# Patient Record
Sex: FEMALE | Race: WHITE | NOT HISPANIC OR LATINO | Employment: PART TIME | URBAN - METROPOLITAN AREA
[De-identification: names, ages, dates, MRNs, and addresses within clinical notes are randomized per-mention and may not be internally consistent; named-entity substitution may affect disease eponyms.]

---

## 2020-08-18 ENCOUNTER — TRANSFERRED RECORDS (OUTPATIENT)
Dept: HEALTH INFORMATION MANAGEMENT | Facility: CLINIC | Age: 30
End: 2020-08-18

## 2020-08-18 LAB
HBV SURFACE AG SERPL QL IA: NORMAL
HIV 1+2 AB+HIV1 P24 AG SERPL QL IA: NORMAL
RUBELLA ANTIBODY IGG QUANTITATIVE: NORMAL IU/ML

## 2020-10-08 ENCOUNTER — TRANSFERRED RECORDS (OUTPATIENT)
Dept: HEALTH INFORMATION MANAGEMENT | Facility: CLINIC | Age: 30
End: 2020-10-08

## 2020-10-08 ENCOUNTER — MEDICAL CORRESPONDENCE (OUTPATIENT)
Dept: HEALTH INFORMATION MANAGEMENT | Facility: CLINIC | Age: 30
End: 2020-10-08

## 2020-10-09 ENCOUNTER — TRANSCRIBE ORDERS (OUTPATIENT)
Dept: MATERNAL FETAL MEDICINE | Facility: CLINIC | Age: 30
End: 2020-10-09

## 2020-10-09 DIAGNOSIS — O26.90 PREGNANCY RELATED CONDITION, ANTEPARTUM: Primary | ICD-10-CM

## 2020-10-12 ENCOUNTER — PRE VISIT (OUTPATIENT)
Dept: MATERNAL FETAL MEDICINE | Facility: CLINIC | Age: 30
End: 2020-10-12

## 2020-10-14 ENCOUNTER — HOSPITAL ENCOUNTER (OUTPATIENT)
Dept: CARDIOLOGY | Facility: CLINIC | Age: 30
Discharge: HOME | End: 2020-10-14
Admitting: PEDIATRICS
Payer: COMMERCIAL

## 2020-10-14 DIAGNOSIS — O26.90 PREGNANCY RELATED CONDITION, ANTEPARTUM: ICD-10-CM

## 2020-10-14 PROCEDURE — 76827 ECHO EXAM OF FETAL HEART: CPT

## 2020-10-14 PROCEDURE — 99203 OFFICE O/P NEW LOW 30 MIN: CPT | Mod: 25 | Performed by: PEDIATRICS

## 2020-10-14 PROCEDURE — 93325 DOPPLER ECHO COLOR FLOW MAPG: CPT | Mod: 26 | Performed by: PEDIATRICS

## 2020-10-14 PROCEDURE — 76825 ECHO EXAM OF FETAL HEART: CPT | Mod: 26 | Performed by: PEDIATRICS

## 2020-10-14 PROCEDURE — 76827 ECHO EXAM OF FETAL HEART: CPT | Mod: 26 | Performed by: PEDIATRICS

## 2020-10-14 NOTE — PROGRESS NOTES
[facility]   Heart Center Fetal Consult Note    Patient:  Janey John MRN:  [id number]   YOB: 1990 Age:  30 year old   Date of Visit:  10/14/2020 PCP:  [facility]     Dear Physician,     I had the pleasure of seeing Janey John at the [facility] on 10/14/2020 in fetal cardiology consultation for fetal echocardiogram results. She presented today accompanied by herself. 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    I performed and interpreted the fetal echocardiogram today, which demonstrated double outlet right ventricle with subaortic ventricular septal defect with aorta posterior and rightward to the pulmonary artery. 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 Antegrade flow across the pulmonary valve.The right and left branch pulmonary arteries are mildly hypoplastic with z-scores of -2.52 and -2.02; respectively. Normal antegrade flow across the ductus arteriosus. Normal right and left ventricular size and systolic function. Fetal heart rate regular at 144 bpm. No hydrops.       With the help of diagrams, I reviewed the echo findings today with Janey John. I discussed the fetal cardiac anatomy, function, physiology, and plans for possible intervention following delivery. The pulmonary valve is moderately hypoplastic with antegrade flow across the valve and ductus arteriosus. We will need to monitor the growth of the pulmonary valve throughout pregnancy and flow across the ductus arteriosus. I explained that a post-newton echocardiogram will be important with monitoring Ed's saturations to determine the need for a shunt. The right and left ventricles are normal in size and this will likely be a 2 ventricle repair. I recommended  delivery at [facility], with plans for post- echocardiogram. She had appropriate questions which I addressed. I provided her with my direct contact information for additional questions after they left.    Plan:    1. Follow up Fetal Echo in 4-5 weeks.  2. This anatomy maybe dependent on the ductus arteriosus, and PGE maybe needed following delivery.  3. Transthoracic echocardiogram to be obtained after delivery immediately on arrival in the NICU.    Thank you for allowing me to participate in Janey's care. Please do not hesitate to contact me with questions or concerns.    This visit was separate from the performance and interpretation of the ultrasound. The majority of the time (>50%) was spent in counseling and coordination of care. I spent approximately 30 minutes in face-to-face time reviewing the above considerations.    Heath Martínez M.D.  Pediatric Cardiology  [facility] [address], 5th floor, [facility] [address]  Phone [phone]  Fax [phone]

## 2020-11-05 ENCOUNTER — TRANSFERRED RECORDS (OUTPATIENT)
Dept: HEALTH INFORMATION MANAGEMENT | Facility: CLINIC | Age: 30
End: 2020-11-05

## 2020-11-19 ENCOUNTER — TELEPHONE (OUTPATIENT)
Dept: EMERGENCY MEDICINE | Facility: CLINIC | Age: 30
End: 2020-11-19

## 2020-11-19 NOTE — TELEPHONE ENCOUNTER
Patient called to cancel fetal echo appointment. Offered to reschedule and patient denied. She said she would call to reschedule. No reason provided for cancellation.

## 2020-12-14 ENCOUNTER — TRANSFERRED RECORDS (OUTPATIENT)
Dept: HEALTH INFORMATION MANAGEMENT | Facility: CLINIC | Age: 30
End: 2020-12-14

## 2020-12-24 ENCOUNTER — TRANSFERRED RECORDS (OUTPATIENT)
Dept: HEALTH INFORMATION MANAGEMENT | Facility: CLINIC | Age: 30
End: 2020-12-24

## 2020-12-28 ENCOUNTER — TRANSFERRED RECORDS (OUTPATIENT)
Dept: HEALTH INFORMATION MANAGEMENT | Facility: CLINIC | Age: 30
End: 2020-12-28

## 2020-12-29 ENCOUNTER — MEDICAL CORRESPONDENCE (OUTPATIENT)
Dept: HEALTH INFORMATION MANAGEMENT | Facility: CLINIC | Age: 30
End: 2020-12-29

## 2020-12-29 ENCOUNTER — TRANSFERRED RECORDS (OUTPATIENT)
Dept: HEALTH INFORMATION MANAGEMENT | Facility: CLINIC | Age: 30
End: 2020-12-29

## 2020-12-30 ENCOUNTER — TELEPHONE (OUTPATIENT)
Dept: MATERNAL FETAL MEDICINE | Facility: CLINIC | Age: 30
End: 2020-12-30

## 2020-12-30 DIAGNOSIS — O36.5990 PREGNANCY AFFECTED BY FETAL GROWTH RESTRICTION: ICD-10-CM

## 2020-12-30 DIAGNOSIS — O35.BXX0 ANOMALY OF HEART OF FETUS AFFECTING PREGNANCY, ANTEPARTUM, SINGLE OR UNSPECIFIED FETUS: Primary | ICD-10-CM

## 2020-12-30 NOTE — TELEPHONE ENCOUNTER
Left message for Janey to call MFM RN coordinator at [phone] regarding appts on 1/6. Pt should come at 0800 for BPP/NST/OBV, fetal echo at 0900.     Amy Bonner RN

## 2021-01-05 ENCOUNTER — DOCUMENTATION ONLY (OUTPATIENT)
Dept: MATERNAL FETAL MEDICINE | Facility: CLINIC | Age: 31
End: 2021-01-05

## 2021-01-05 DIAGNOSIS — O35.BXX0 ANOMALY OF HEART OF FETUS AFFECTING PREGNANCY, ANTEPARTUM, SINGLE OR UNSPECIFIED FETUS: Primary | ICD-10-CM

## 2021-01-05 NOTE — PROGRESS NOTES
This patient was reviewed at the Pediatric Genetics Meeting on 1/5/21. Due to the fetal CHD, the geneticists recommended ordering a microarray on cordblood at delivery as well as an inpatient genetics consult. Genetic counseling will meet with Janey prior to delivery to obtain consent for cordblood testing as well as place orders in the fetal chart.    Orders placed for genetic counseling appt.    Lyssa Felix MS, [facility]  Genetic Counselor  Maternal Fetal Medicine  [facility]   Phone: [phone]  Pager: [phone]  Email: [email or web address]

## 2021-01-06 ENCOUNTER — OFFICE VISIT (OUTPATIENT)
Dept: MATERNAL FETAL MEDICINE | Facility: CLINIC | Age: 31
End: 2021-01-06
Attending: OBSTETRICS & GYNECOLOGY
Payer: MEDICAID

## 2021-01-06 ENCOUNTER — HOSPITAL ENCOUNTER (OUTPATIENT)
Dept: ULTRASOUND IMAGING | Facility: CLINIC | Age: 31
End: 2021-01-06
Attending: OBSTETRICS & GYNECOLOGY
Payer: MEDICAID

## 2021-01-06 ENCOUNTER — HOSPITAL ENCOUNTER (OUTPATIENT)
Dept: CARDIOLOGY | Facility: CLINIC | Age: 31
End: 2021-01-06
Payer: MEDICAID

## 2021-01-06 VITALS
HEART RATE: 79 BPM | DIASTOLIC BLOOD PRESSURE: 68 MMHG | WEIGHT: 175 LBS | RESPIRATION RATE: 18 BRPM | OXYGEN SATURATION: 100 % | SYSTOLIC BLOOD PRESSURE: 109 MMHG

## 2021-01-06 DIAGNOSIS — O36.5990 PREGNANCY AFFECTED BY FETAL GROWTH RESTRICTION: ICD-10-CM

## 2021-01-06 DIAGNOSIS — O09.93 SUPERVISION OF HIGH RISK PREGNANCY IN THIRD TRIMESTER: Primary | ICD-10-CM

## 2021-01-06 DIAGNOSIS — O26.90 PREGNANCY RELATED CONDITION, ANTEPARTUM: ICD-10-CM

## 2021-01-06 DIAGNOSIS — O35.BXX0 ANOMALY OF HEART OF FETUS AFFECTING PREGNANCY, ANTEPARTUM, SINGLE OR UNSPECIFIED FETUS: Primary | ICD-10-CM

## 2021-01-06 DIAGNOSIS — O35.BXX0 ANOMALY OF HEART OF FETUS AFFECTING PREGNANCY, ANTEPARTUM, SINGLE OR UNSPECIFIED FETUS: ICD-10-CM

## 2021-01-06 PROCEDURE — 93325 DOPPLER ECHO COLOR FLOW MAPG: CPT | Mod: XU

## 2021-01-06 PROCEDURE — G0463 HOSPITAL OUTPT CLINIC VISIT: HCPCS | Mod: 25

## 2021-01-06 PROCEDURE — 76819 FETAL BIOPHYS PROFIL W/O NST: CPT

## 2021-01-06 PROCEDURE — 93325 DOPPLER ECHO COLOR FLOW MAPG: CPT | Mod: 26 | Performed by: PEDIATRICS

## 2021-01-06 PROCEDURE — 99213 OFFICE O/P EST LOW 20 MIN: CPT | Mod: 25 | Performed by: ADVANCED PRACTICE MIDWIFE

## 2021-01-06 PROCEDURE — 76825 ECHO EXAM OF FETAL HEART: CPT | Mod: 26 | Performed by: PEDIATRICS

## 2021-01-06 PROCEDURE — 76827 ECHO EXAM OF FETAL HEART: CPT | Mod: 26 | Performed by: PEDIATRICS

## 2021-01-06 PROCEDURE — 76820 UMBILICAL ARTERY ECHO: CPT | Mod: 26 | Performed by: OBSTETRICS & GYNECOLOGY

## 2021-01-06 PROCEDURE — 76818 FETAL BIOPHYS PROFILE W/NST: CPT | Mod: 26 | Performed by: OBSTETRICS & GYNECOLOGY

## 2021-01-06 PROCEDURE — 59025 FETAL NON-STRESS TEST: CPT

## 2021-01-06 RX ORDER — VITAMIN A ACETATE, .BETA.-CAROTENE, ASCORBIC ACID, CHOLECALCIFEROL, .ALPHA.-TOCOPHEROL ACETATE, DL-, THIAMINE MONONITRATE, RIBOFLAVIN, NIACINAMIDE, PYRIDOXINE HYDROCHLORIDE, FOLIC ACID, CYANOCOBALAMIN, CALCIUM CARBONATE, FERROUS FUMARATE, ZINC OXIDE, AND CUPRIC OXIDE 2000; 2000; 120; 400; 22; 1.84; 3; 20; 10; 1; 12; 200; 27; 25; 2 [IU]/1; [IU]/1; MG/1; [IU]/1; MG/1; MG/1; MG/1; MG/1; MG/1; MG/1; UG/1; MG/1; MG/1; MG/1; MG/1
1 TABLET ORAL
COMMUNITY

## 2021-01-06 ASSESSMENT — PAIN SCALES - GENERAL: PAINLEVEL: NO PAIN (0)

## 2021-01-06 NOTE — PROGRESS NOTES
[facility]   Heart Center Fetal Consult Note    Patient:  Janey John MRN:  [id number]   YOB: 1990 Age:  30 year old   Date of Visit:  2021 PCP:  [facility]     Dear Physician,     I had the pleasure of seeing Janey John at the [facility] on 2021 in fetal cardiology consultation for ongoing fetal management. She presented today accompanied by herself. 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    I performed and interpreted the fetal echocardiogram today, which demonstrated double outlet right ventricle with subaortic ventricular septal defect with aorta posterior and rightward to the pulmonary artery. Mildl hypoplasia of the pulmonary valve and main pulmonary artery. Antegrade flow across the pulmonary valve.The right and left branch pulmonary arteries are likely normal in size. The ductus arteriosus was not well seen on today's study. Normal right and left ventricular size and systolic function. Fetal heart rate regular at 149 bpm. No hydrops.     I reviewed the echo findings today with Janey John. I discussed the fetal cardiac anatomy, function, physiology, and plans for evaluation post-natally. I recommended delivery at [facility], with plans for a cardiology consult and an echocardiogram in a non-urgent fashion. I reviewed the importance of a post-newton transthoracic echocardiogram to confirm these findings and help direct management. She had appropriate questions which I addressed. I provided her with my direct contact information for additional questions after they left.    Plan:    1. No follow up fetal echocardiogram.   2. This anatomy is likely not dependent on the ductus arteriosus.   3. Transthoracic echocardiogram and cardiology consult to be obtained after  in non-urgent fashion within the first 12 hours of life.     Thank you for allowing me to participate in Janey's care. Please do not hesitate to contact me with questions or concerns.    This visit was separate from the performance and interpretation of the ultrasound. The majority of the time (>50%) was spent in counseling and coordination of care. I spent approximately 15 minutes in face-to-face time reviewing the above considerations.    Heath Martínez M.D.  Pediatric Cardiology  [facility] [address], 5th floor, [place] [address]  Phone [phone]  Fax [phone]

## 2021-01-06 NOTE — PROGRESS NOTES
Maternal-Fetal Medicine   First OB Visit    Janey John  : 1990  MRN: [id number]      HPI:  Janey John is a 30 year old  at 33w4d by LMP consistent with 13w0d US here for new OB visit     Today she is feeling well. Had US, NST, and fetal echo prior to OB visit and is understandably feeling a little overwhelmed/tired. Does not have any pregnancy concerns at this time. Prefers to await labor rather than be induced if possible, but understanding there might be other recommendations. She reports that based on today's echo, the pediatric team is anticipating surgical repair of baby's heart a few months after delivery.    Pregnancy complicated by:  - DORV, VSD  - Bilateral UTD - resolved  - Enlarged cisterna magna  - FGR (8%tile on )    Prenatal Care:  Primary OB care this pregnancy has been with Vanessa Booth CNM  from [facility].    Dating:    LMP: 20, cycles regular  Dating ultrasound: 20 at 13w0d  Assisted reproduction: none  Assigned EDC: 21 by LMP      OB HISTORY  OB History    Para Term  AB Living   3 2 2 0 0 2   SAB TAB Ectopic Multiple Live Births   0 0 0 0 0      # Outcome Date GA Lbr Sav/2nd Weight Sex Delivery Anes PTL Lv   3 Current            2 Term  40w1d       JAUN   1 Term  40w0d       JAUN         History of GDM: No,  PTL : No,  History of HTN in pregnancy: No,  Thrombocytopenia: No,  Shoulder dystocia: No,  Vacuum Extraction: No  PPH: No   3rd of 4th degree laceration: No.   Other complications: No    Gynecologic History:  - Last Pap: 20, NILM  - Denies any history of abnormal pap smears  - Denies prior cervical surgery or procedures  - Denies any history of frequent UTIs, vaginal infections, or STIs    Past Medical History:  none  Past Surgical History:  Removal of ganglion cyst on wrist    INFECTION HISTORY  HIV: No  Hepatitis B: No  Hepatitis C: No  Tuberculosis: No    Genital Herpes self: no  Herpes partner:   "no  Chlamydia:  no  Gonorrhea:  no  HPV: No  BV:  No  Syphilis:  No  Chicken Pox:  Yes - vaccine    Current Medications:  Prior to Admission medications    Medication Sig Last Dose Taking? Auth Provider   Prenatal Vit-Fe Fumarate-FA (PNV PRENATAL PLUS MULTIVITAMIN) 27-1 MG TABS per tablet Take 1 tablet by mouth Taking Yes Reported, Patient       Allergies:  NKDA    Social History:   Occupation: part-time   Status: , feels safe in relationship. Lives with  and other children  Denies use of alcohol, drugs or smoking.    Family History:  Denies history of genetic disorders, preeeclampsia, thromboembolic disease, bleeding disorders, mental retardation    Partner History:  Non-contributory    ROS:  10-point ROS negative except as in HPI     PHYSICAL EXAM:    LMP 2020     Physical Exam  HENT:      Head: Normocephalic.   Cardiovascular:      Rate and Rhythm: Normal rate.   Pulmonary:      Effort: Pulmonary effort is normal. No respiratory distress.   Abdominal:      General: There is no distension.      Tenderness: There is no abdominal tenderness.      Comments: gravid   Musculoskeletal: Normal range of motion.   Neurological:      General: No focal deficit present.      Mental Status: She is alert and oriented to person, place, and time.   Skin:     General: Skin is warm and dry.   Psychiatric:         Mood and Affect: Mood normal.         Behavior: Behavior normal.       Prenatal Labs:    Rh+  Neg steven screen  Rubella: immune  GC/CT:  HIV: NR  RPR: NR  HepB: NR  GCT: pass     Genetic Testing:   Low-risk NIPT    Ultrasounds:   Please see \"imaging\" tab under chart review for today's ultrasound results.    Other Imaging:   Please see Fetal Echo under \"Cardiology\" tab for today's results.      ASSESSMENT:    Janey John is a 30 year old  at 33w4d by LMP consistent with 13w0d US here for new OB visit     Pregnancy complicated by:   - DORV, VSD  - Bilateral UTD - resolved  - Enlarged " cisterna magna  - FGR (8%tile on )    PLAN:    - Oriented patient to [facility] practice. Reviewed collaborative care with [facility] MDs, CNM, [place], and Residents in clinic. Discussed intrapartum management with [facility] clinic. Discussed care could be managed by MD or CNM group for labor. Patient prefers CNM care as she had CNMs for her previous births.    Routine PNC:  - Immunizations: s/p Tdap  - Genetics: Low-risk NIPT. Desires  testing. Plan for future visit with  to obtain consent and plan for inpatient consult.     Surveillance:  - Serial growth US. Last on    - Weekly limited US with UAR/NST    Delivery Planning:  - NICU consult upcoming on   - Genetic counselor consult upcoming on   - We discussed Janey's preference to await spontaneous labor over an induction. We reviewed that FGR and CHD will be monitored closely as her pregnancy progresses and the stability of these factors are what will help guide timing of delivery.   - Need to discuss birth preferences, feeding method, and contraception at upcoming visit.         Lydia Oliver CNM on 2021 at 9:26 AM

## 2021-01-06 NOTE — PROGRESS NOTES
"Please see \"Imaging\" tab under \"Chart Review\" for details of today's US.    Ashley Schreiber, DO    "

## 2021-01-06 NOTE — NURSING NOTE
Jaeny seen in clinic today for Fetal Echo, BPP and OB visit at 33w4d gestation for pregnancy complicated by fetal DORV with VSD, enlarged cisterna magna and FGR  (see report/notes). VSS. Pt reports postive fetal movement, see flowsheet. Pt denies bldg/lof/change in discharge/headache/vision changes/chest pain/SOB/edema. Janey report mild occasional contractions.  New OB folder information and teaching sheets given and reviewed. Contact card for PCC and Birthplace given. Dr. Schreiber reviewed US and NST, Lydia Oliver CNM met with pt or OB visit and discussed POC. Plan for weekly BPP/UAR/NST, growth US in 2 weeks, next OB visit in 2 weeks, will also meet with GC at that time to discuss testing after delivery. Future visits scheduled at . Fetal Echo done, see epic reports. Pt discharged stable and ambulatory.    Kandis Butterfield RN

## 2021-01-07 DIAGNOSIS — O36.5990 PREGNANCY AFFECTED BY FETAL GROWTH RESTRICTION: Primary | ICD-10-CM

## 2021-01-07 PROCEDURE — 99207 PR FETAL NON-STRESS TEST: CPT

## 2021-01-09 ENCOUNTER — HEALTH MAINTENANCE LETTER (OUTPATIENT)
Age: 31
End: 2021-01-09

## 2021-01-13 ENCOUNTER — OFFICE VISIT (OUTPATIENT)
Dept: MATERNAL FETAL MEDICINE | Facility: CLINIC | Age: 31
End: 2021-01-13
Attending: OBSTETRICS & GYNECOLOGY
Payer: MEDICAID

## 2021-01-13 ENCOUNTER — HOSPITAL ENCOUNTER (OUTPATIENT)
Dept: ULTRASOUND IMAGING | Facility: CLINIC | Age: 31
End: 2021-01-13
Attending: ADVANCED PRACTICE MIDWIFE
Payer: MEDICAID

## 2021-01-13 DIAGNOSIS — O36.5990 PREGNANCY AFFECTED BY FETAL GROWTH RESTRICTION: ICD-10-CM

## 2021-01-13 DIAGNOSIS — O09.93 SUPERVISION OF HIGH RISK PREGNANCY IN THIRD TRIMESTER: ICD-10-CM

## 2021-01-13 DIAGNOSIS — O35.BXX0 ANOMALY OF HEART OF FETUS AFFECTING PREGNANCY, ANTEPARTUM, SINGLE OR UNSPECIFIED FETUS: ICD-10-CM

## 2021-01-13 PROCEDURE — 76819 FETAL BIOPHYS PROFIL W/O NST: CPT

## 2021-01-13 PROCEDURE — 99242 OFF/OP CONSLTJ NEW/EST SF 20: CPT

## 2021-01-13 PROCEDURE — 59025 FETAL NON-STRESS TEST: CPT

## 2021-01-13 PROCEDURE — 76820 UMBILICAL ARTERY ECHO: CPT | Mod: 26 | Performed by: OBSTETRICS & GYNECOLOGY

## 2021-01-13 PROCEDURE — 59025 FETAL NON-STRESS TEST: CPT | Mod: 26 | Performed by: OBSTETRICS & GYNECOLOGY

## 2021-01-13 PROCEDURE — 76819 FETAL BIOPHYS PROFIL W/O NST: CPT | Mod: 26 | Performed by: OBSTETRICS & GYNECOLOGY

## 2021-01-13 NOTE — PROGRESS NOTES
NEONATOLOGY CONSULTATION  DATE: 2021    I had the opportunity to meet with Ms. Janey John in the Maternal Fetal Medicine Clinic at the [facility] at the request of Dr. Ashley Schreiber. Janey is currently 34 weeks and 4 days pregnant. She is expecting a boy, to be named Ed, who has been diagnosed with double outlet right ventricle, VSD, mild hypoplasia of the pulmonary valve and main pulmonary artery, enlarged cisterna magna, and fetal growth restriction. Bilateral urinary tract dilation (UTD A2-3) was previously noted, but has resolved on recent fetal ultrasounds. Additional testing has included low risk NIPT.    At this time, the plan is for vaginal delivery at term. We reviewed the typical  course for an infant with DORV and VSD. I described the  Resuscitation team that will be present at the delivery prior to admission to the  Intensive Care Unit. We discussed the varying levels of support that may be needed in the delivery room, however, reported that many infants with this type of cardiac lesion may do well after the initial delivery process. I described the admission process for the  Intensive Care Unit while confirmatory diagnostic efforts are underway. Per cardiology, the anatomy is likely not dependent on the ductus arteriosus.  I described the ongoing collaboration with Pediatric Cardiology and that a  echo would be obtained in the first 12 hours of life. We discussed the importance of the post- echocardiogram findings for confirming the cardiac diagnoses and then making ongoing cardiac plans. We discussed the extensive collaboration of the entire Cardiovascular Surgery team.     I described the basic makeup of the  Intensive Care Unit team the daily rounding structure as well as visiting restrictions related to the COVID-19 pandemic and the viral season. We discussed the additional resources present from  lactation, occupational therapy, and maternal child health social work. At this time, Janey is planning on breastfeeding. We discussed that there may be a period of time when her infant is not able to receive oral or enteral feedings. We talked about resources to establish her supply and store breast milk until feedings are initiated. We briefly reviewed the transfer process to the Cardiovascular Intensive Care Unit should there be any  requirements for ongoing surgical care.     We look forward to caring for the infant son of Ms. John in the  Intensive Care Unit at the [facility]. Please do not hesitate to contact me if I can be of further assistance prior to delivery.     Sincerely,   SHELLEY VIRGEN MD    Total time of visit 30 minutes with 100% of the time in direct patient consultation.

## 2021-01-13 NOTE — NURSING NOTE
Pt presents to [facility] for NST/BPP/NICU/SW consult due to pregnancy c/b fetal CHD and FGR currently 34w4d gestation. Pt reports + FM. Denies LOF, vaginal bleeding or spotting or contractions. NST Performed due to fetal CHD and FGR.  Dr. Zarate reviewed efm tracing. See NST/BPP Doc Flowsheet tab. Pt met with Dr. Lucas for NICU consult. Follow scheduled for next week.       Amy Bonner RN

## 2021-01-13 NOTE — PROGRESS NOTES
Please see the imaging tab for details of the ultrasound performed today.    Zarina Zarate MD  Specialist in Maternal-Fetal Medicine

## 2021-01-20 ENCOUNTER — OFFICE VISIT (OUTPATIENT)
Dept: MATERNAL FETAL MEDICINE | Facility: CLINIC | Age: 31
End: 2021-01-20
Attending: ADVANCED PRACTICE MIDWIFE
Payer: MEDICAID

## 2021-01-20 ENCOUNTER — HOSPITAL ENCOUNTER (OUTPATIENT)
Dept: ULTRASOUND IMAGING | Facility: CLINIC | Age: 31
End: 2021-01-20
Attending: ADVANCED PRACTICE MIDWIFE
Payer: MEDICAID

## 2021-01-20 ENCOUNTER — OFFICE VISIT (OUTPATIENT)
Dept: MATERNAL FETAL MEDICINE | Facility: CLINIC | Age: 31
End: 2021-01-20
Attending: OBSTETRICS & GYNECOLOGY
Payer: MEDICAID

## 2021-01-20 VITALS
HEART RATE: 83 BPM | RESPIRATION RATE: 18 BRPM | SYSTOLIC BLOOD PRESSURE: 117 MMHG | DIASTOLIC BLOOD PRESSURE: 68 MMHG | OXYGEN SATURATION: 98 % | WEIGHT: 175.7 LBS

## 2021-01-20 DIAGNOSIS — O09.93 SUPERVISION OF HIGH RISK PREGNANCY IN THIRD TRIMESTER: Primary | ICD-10-CM

## 2021-01-20 DIAGNOSIS — O09.93 SUPERVISION OF HIGH RISK PREGNANCY IN THIRD TRIMESTER: ICD-10-CM

## 2021-01-20 DIAGNOSIS — O35.BXX0 ANOMALY OF HEART OF FETUS AFFECTING PREGNANCY, ANTEPARTUM, SINGLE OR UNSPECIFIED FETUS: ICD-10-CM

## 2021-01-20 DIAGNOSIS — Z34.90 PREGNANCY, UNSPECIFIED GESTATIONAL AGE: ICD-10-CM

## 2021-01-20 DIAGNOSIS — O36.5990 PREGNANCY AFFECTED BY FETAL GROWTH RESTRICTION: ICD-10-CM

## 2021-01-20 DIAGNOSIS — O35.BXX0 ANOMALY OF HEART OF FETUS AFFECTING PREGNANCY, ANTEPARTUM, SINGLE OR UNSPECIFIED FETUS: Primary | ICD-10-CM

## 2021-01-20 PROCEDURE — 76816 OB US FOLLOW-UP PER FETUS: CPT | Mod: 26 | Performed by: OBSTETRICS & GYNECOLOGY

## 2021-01-20 PROCEDURE — 76819 FETAL BIOPHYS PROFIL W/O NST: CPT

## 2021-01-20 PROCEDURE — 76816 OB US FOLLOW-UP PER FETUS: CPT

## 2021-01-20 PROCEDURE — 99213 OFFICE O/P EST LOW 20 MIN: CPT | Mod: 25 | Performed by: ADVANCED PRACTICE MIDWIFE

## 2021-01-20 PROCEDURE — 76819 FETAL BIOPHYS PROFIL W/O NST: CPT | Mod: 26 | Performed by: OBSTETRICS & GYNECOLOGY

## 2021-01-20 PROCEDURE — G0463 HOSPITAL OUTPT CLINIC VISIT: HCPCS | Mod: 25

## 2021-01-20 NOTE — NURSING NOTE
Janey seen in clinic today for ultrasound and prenatal visit at 35w4d gestation (see report/notes). Pt initially scheduled for NST due to FGR but arrived late, so US, RL2/BPP was performed first. No longer FGR, no NST performed. Pregnancy is c/b fetal CHD, UTDA1, previous FGR. Dr. Mary met with patient to review US findings. VSS. Pt denies bldg/lof/change in discharge/contractions/headache/vision changes/chest pain/SOB/edema. Lydia Oliver CNM met with pt and discussed POC. Plan to weekly prenatal visits and BPP. Rpt growth in 3 weeks. IOL 39-40 weeks.  Pt left prior to meeting with GC. Will reschedule to next week. Pt discharged stable and ambulatory.     Amy Bonner RN

## 2021-01-20 NOTE — PROGRESS NOTES
"Please see \"Imaging\" tab under \"Chart Review\" for details of today's visit.    Virgil Mary    "

## 2021-01-20 NOTE — PROGRESS NOTES
"Maternal fetal Medicine OB Follow up visit.     Janey John  : 1990  MRN: [id number]    CC: OB Follow-up    Subjective:  Janey John is a 30 year old  at 35w4d presenting for routine OB follow-up. Today, she is feeling well. Has no pregnancy concerns. She denies regular, painful contractions, denies loss of fluid or vaginal bleeding.  Reports fetal movement.      She also denies any recent fevers/chills, headaches or changes in vision, RUQ pain, nausea or vomiting, constipation, diarrhea or other systemic symptoms.      OB Hx:  OB History    Para Term  AB Living   3 2 2 0 0 2   SAB TAB Ectopic Multiple Live Births   0 0 0 0 2      # Outcome Date GA Lbr Sav/2nd Weight Sex Delivery Anes PTL Lv   3 Current            2 Term 2019 40w1d       JAUN   1 Term 2017 40w0d       JAUN         Objective:  /68 (BP Location: Left arm, Patient Position: Chair)   Pulse 83   Resp 18   Wt 79.7 kg (175 lb 11.2 oz)   LMP 2020   SpO2 98%     Gen: alert, oriented, NAD  Pulm: breathing unlabored, no SOB  Abdominal: gravid, non-tender  Extremities: WNL    OB Ultrasound:  Please see \"imaging\" tab under chart review for today's ultrasound results.      Assessment/Plan:  30 year old  at 35w4d here for follow OB visit.    Pregnancy has been complicated by:   - DORV, VSD  - Bilateral UTD - resolved, but now seen again today  - Enlarged cisterna magna  - FGR (8%tile on ) - resolved today. 12%tile on     #DORV:  - no further echos indicated, but plan for non-urgent echo upon NICU admission    #UTDA1:  - Jnaey declines prenatal Peds Urology consult.    #FGR:  - Will reassess growth in 3 weeks. If FGR returns, plan for IOL between 38-39 weeks.     #Routine PNC:  - NOB Labs reviewed:    Rh O+, antibody neg   HepB/HIV/RPR: NR   Rubella: Immune   GCT: pass  - Pap smear: NILM   -Immunizations:  s/p TDap and s/p Flu  - Genetic screening: Low-risk NIPT  - GBS at next " visit    # Surveillance:  - Serial growth US  - Weekly BPPs    #Delivery planning:  - S/p NICU consult  - Reviewed that if FGR remains resolved, timing of delivery can wait until her SHANEKA, but if FGR returns will plan for IOL between 38-39wks.   - Will plan for [facility] CNM care in labor.   - Previous births were unmedicated, but is considering an epidural for this labor.  - Feeding: breastfeeding  - Contraception plans: natural family planning    RTC in 1 week    15 minutes spent on the date of the encounter, doing chart review, history and exam, documentation and further activities as noted.      Lydia Oliver CNM on 2021 at 10:17 AM

## 2021-01-25 ENCOUNTER — ANESTHESIA EVENT (OUTPATIENT)
Dept: OBGYN | Facility: CLINIC | Age: 31
End: 2021-01-25
Payer: MEDICAID

## 2021-01-25 ENCOUNTER — ANESTHESIA (OUTPATIENT)
Dept: OBGYN | Facility: CLINIC | Age: 31
End: 2021-01-25
Payer: MEDICAID

## 2021-01-25 ENCOUNTER — HOSPITAL ENCOUNTER (INPATIENT)
Facility: CLINIC | Age: 31
LOS: 1 days | Discharge: HOME | End: 2021-01-26
Attending: OBSTETRICS & GYNECOLOGY | Admitting: ADVANCED PRACTICE MIDWIFE
Payer: MEDICAID

## 2021-01-25 LAB
ABO + RH BLD: NORMAL
ABO + RH BLD: NORMAL
BASOPHILS # BLD AUTO: 0 10E9/L (ref 0–0.2)
BASOPHILS NFR BLD AUTO: 0.3 %
BLD GP AB SCN SERPL QL: NORMAL
BLOOD BANK CMNT PATIENT-IMP: NORMAL
DIFFERENTIAL METHOD BLD: ABNORMAL
EOSINOPHIL # BLD AUTO: 0.1 10E9/L (ref 0–0.7)
EOSINOPHIL NFR BLD AUTO: 0.3 %
ERYTHROCYTE [DISTWIDTH] IN BLOOD BY AUTOMATED COUNT: 12.4 % (ref 10–15)
HCT VFR BLD AUTO: 37.7 % (ref 35–47)
HGB BLD-MCNC: 13 G/DL (ref 11.7–15.7)
IMM GRANULOCYTES # BLD: 0.1 10E9/L (ref 0–0.4)
IMM GRANULOCYTES NFR BLD: 0.3 %
LABORATORY COMMENT REPORT: ABNORMAL
LYMPHOCYTES # BLD AUTO: 1.6 10E9/L (ref 0.8–5.3)
LYMPHOCYTES NFR BLD AUTO: 10.3 %
MCH RBC QN AUTO: 28.8 PG (ref 26.5–33)
MCHC RBC AUTO-ENTMCNC: 34.5 G/DL (ref 31.5–36.5)
MCV RBC AUTO: 84 FL (ref 78–100)
MONOCYTES # BLD AUTO: 0.5 10E9/L (ref 0–1.3)
MONOCYTES NFR BLD AUTO: 3.2 %
NEUTROPHILS # BLD AUTO: 13.6 10E9/L (ref 1.6–8.3)
NEUTROPHILS NFR BLD AUTO: 85.6 %
NRBC # BLD AUTO: 0 10*3/UL
NRBC BLD AUTO-RTO: 0 /100
PLATELET # BLD AUTO: 245 10E9/L (ref 150–450)
RBC # BLD AUTO: 4.51 10E12/L (ref 3.8–5.2)
SARS-COV-2 RNA RESP QL NAA+PROBE: POSITIVE
SPECIMEN EXP DATE BLD: NORMAL
SPECIMEN SOURCE: ABNORMAL
T PALLIDUM AB SER QL: NONREACTIVE
WBC # BLD AUTO: 15.9 10E9/L (ref 4–11)

## 2021-01-25 PROCEDURE — 3E0R3BZ INTRODUCTION OF ANESTHETIC AGENT INTO SPINAL CANAL, PERCUTANEOUS APPROACH: ICD-10-PCS | Performed by: ANESTHESIOLOGY

## 2021-01-25 PROCEDURE — 96372 THER/PROPH/DIAG INJ SC/IM: CPT | Performed by: ADVANCED PRACTICE MIDWIFE

## 2021-01-25 PROCEDURE — 88307 TISSUE EXAM BY PATHOLOGIST: CPT | Mod: 26 | Performed by: PATHOLOGY

## 2021-01-25 PROCEDURE — 88307 TISSUE EXAM BY PATHOLOGIST: CPT | Mod: TC | Performed by: ADVANCED PRACTICE MIDWIFE

## 2021-01-25 PROCEDURE — 86901 BLOOD TYPING SEROLOGIC RH(D): CPT | Performed by: ADVANCED PRACTICE MIDWIFE

## 2021-01-25 PROCEDURE — 250N000009 HC RX 250

## 2021-01-25 PROCEDURE — 00HU33Z INSERTION OF INFUSION DEVICE INTO SPINAL CANAL, PERCUTANEOUS APPROACH: ICD-10-PCS | Performed by: ANESTHESIOLOGY

## 2021-01-25 PROCEDURE — 250N000011 HC RX IP 250 OP 636: Performed by: ADVANCED PRACTICE MIDWIFE

## 2021-01-25 PROCEDURE — 258N000003 HC RX IP 258 OP 636: Performed by: ADVANCED PRACTICE MIDWIFE

## 2021-01-25 PROCEDURE — 120N000002 HC R&B MED SURG/OB UMMC

## 2021-01-25 PROCEDURE — 86850 RBC ANTIBODY SCREEN: CPT | Performed by: ADVANCED PRACTICE MIDWIFE

## 2021-01-25 PROCEDURE — 250N000013 HC RX MED GY IP 250 OP 250 PS 637: Performed by: ADVANCED PRACTICE MIDWIFE

## 2021-01-25 PROCEDURE — 87635 SARS-COV-2 COVID-19 AMP PRB: CPT | Performed by: ADVANCED PRACTICE MIDWIFE

## 2021-01-25 PROCEDURE — 86780 TREPONEMA PALLIDUM: CPT | Performed by: ADVANCED PRACTICE MIDWIFE

## 2021-01-25 PROCEDURE — 36415 COLL VENOUS BLD VENIPUNCTURE: CPT | Performed by: ADVANCED PRACTICE MIDWIFE

## 2021-01-25 PROCEDURE — 85025 COMPLETE CBC W/AUTO DIFF WBC: CPT | Performed by: ADVANCED PRACTICE MIDWIFE

## 2021-01-25 PROCEDURE — 86900 BLOOD TYPING SEROLOGIC ABO: CPT | Performed by: ADVANCED PRACTICE MIDWIFE

## 2021-01-25 PROCEDURE — 87653 STREP B DNA AMP PROBE: CPT | Performed by: ADVANCED PRACTICE MIDWIFE

## 2021-01-25 PROCEDURE — 59410 OBSTETRICAL CARE: CPT | Performed by: ADVANCED PRACTICE MIDWIFE

## 2021-01-25 PROCEDURE — 722N000001 HC LABOR CARE VAGINAL DELIVERY SINGLE

## 2021-01-25 PROCEDURE — 250N000011 HC RX IP 250 OP 636

## 2021-01-25 PROCEDURE — 250N000009 HC RX 250: Performed by: ADVANCED PRACTICE MIDWIFE

## 2021-01-25 PROCEDURE — G0463 HOSPITAL OUTPT CLINIC VISIT: HCPCS | Mod: 25

## 2021-01-25 PROCEDURE — 10907ZC DRAINAGE OF AMNIOTIC FLUID, THERAPEUTIC FROM PRODUCTS OF CONCEPTION, VIA NATURAL OR ARTIFICIAL OPENING: ICD-10-PCS | Performed by: ADVANCED PRACTICE MIDWIFE

## 2021-01-25 RX ORDER — NALOXONE HYDROCHLORIDE 0.4 MG/ML
0.2 INJECTION, SOLUTION INTRAMUSCULAR; INTRAVENOUS; SUBCUTANEOUS
Status: DISCONTINUED | OUTPATIENT
Start: 2021-01-25 | End: 2021-01-25

## 2021-01-25 RX ORDER — METHYLERGONOVINE MALEATE 0.2 MG/ML
200 INJECTION INTRAVENOUS
Status: DISCONTINUED | OUTPATIENT
Start: 2021-01-25 | End: 2021-01-25

## 2021-01-25 RX ORDER — LIDOCAINE HYDROCHLORIDE 10 MG/ML
INJECTION, SOLUTION EPIDURAL; INFILTRATION; INTRACAUDAL; PERINEURAL
Status: DISCONTINUED
Start: 2021-01-25 | End: 2021-01-25 | Stop reason: HOSPADM

## 2021-01-25 RX ORDER — NALBUPHINE HYDROCHLORIDE 10 MG/ML
2.5-5 INJECTION, SOLUTION INTRAMUSCULAR; INTRAVENOUS; SUBCUTANEOUS EVERY 6 HOURS PRN
Status: DISCONTINUED | OUTPATIENT
Start: 2021-01-25 | End: 2021-01-25

## 2021-01-25 RX ORDER — NALOXONE HYDROCHLORIDE 0.4 MG/ML
0.4 INJECTION, SOLUTION INTRAMUSCULAR; INTRAVENOUS; SUBCUTANEOUS
Status: DISCONTINUED | OUTPATIENT
Start: 2021-01-25 | End: 2021-01-25

## 2021-01-25 RX ORDER — FENTANYL/BUPIVACAINE/NS/PF 2-1250MCG
PLASTIC BAG, INJECTION (ML) INJECTION
Status: COMPLETED
Start: 2021-01-25 | End: 2021-01-25

## 2021-01-25 RX ORDER — IBUPROFEN 800 MG/1
800 TABLET, FILM COATED ORAL EVERY 6 HOURS PRN
Status: DISCONTINUED | OUTPATIENT
Start: 2021-01-25 | End: 2021-01-26 | Stop reason: HOSPADM

## 2021-01-25 RX ORDER — MISOPROSTOL 200 UG/1
TABLET ORAL
Status: DISCONTINUED
Start: 2021-01-25 | End: 2021-01-25 | Stop reason: HOSPADM

## 2021-01-25 RX ORDER — NALOXONE HYDROCHLORIDE 0.4 MG/ML
0.4 INJECTION, SOLUTION INTRAMUSCULAR; INTRAVENOUS; SUBCUTANEOUS
Status: DISCONTINUED | OUTPATIENT
Start: 2021-01-25 | End: 2021-01-26 | Stop reason: HOSPADM

## 2021-01-25 RX ORDER — SODIUM CHLORIDE, SODIUM LACTATE, POTASSIUM CHLORIDE, CALCIUM CHLORIDE 600; 310; 30; 20 MG/100ML; MG/100ML; MG/100ML; MG/100ML
INJECTION, SOLUTION INTRAVENOUS CONTINUOUS
Status: DISCONTINUED | OUTPATIENT
Start: 2021-01-25 | End: 2021-01-25

## 2021-01-25 RX ORDER — OXYCODONE AND ACETAMINOPHEN 5; 325 MG/1; MG/1
1 TABLET ORAL
Status: DISCONTINUED | OUTPATIENT
Start: 2021-01-25 | End: 2021-01-25

## 2021-01-25 RX ORDER — OXYTOCIN/0.9 % SODIUM CHLORIDE 30/500 ML
100-340 PLASTIC BAG, INJECTION (ML) INTRAVENOUS CONTINUOUS PRN
Status: COMPLETED | OUTPATIENT
Start: 2021-01-25 | End: 2021-01-25

## 2021-01-25 RX ORDER — LIDOCAINE HYDROCHLORIDE AND EPINEPHRINE 15; 5 MG/ML; UG/ML
INJECTION, SOLUTION EPIDURAL PRN
Status: DISCONTINUED | OUTPATIENT
Start: 2021-01-25 | End: 2021-01-25 | Stop reason: HOSPADM

## 2021-01-25 RX ORDER — OXYTOCIN 10 [USP'U]/ML
10 INJECTION, SOLUTION INTRAMUSCULAR; INTRAVENOUS
Status: DISCONTINUED | OUTPATIENT
Start: 2021-01-25 | End: 2021-01-26 | Stop reason: HOSPADM

## 2021-01-25 RX ORDER — AMOXICILLIN 250 MG
1 CAPSULE ORAL 2 TIMES DAILY
Status: DISCONTINUED | OUTPATIENT
Start: 2021-01-25 | End: 2021-01-26 | Stop reason: HOSPADM

## 2021-01-25 RX ORDER — OXYTOCIN/0.9 % SODIUM CHLORIDE 30/500 ML
100 PLASTIC BAG, INJECTION (ML) INTRAVENOUS CONTINUOUS
Status: DISCONTINUED | OUTPATIENT
Start: 2021-01-25 | End: 2021-01-26 | Stop reason: HOSPADM

## 2021-01-25 RX ORDER — NALOXONE HYDROCHLORIDE 0.4 MG/ML
0.2 INJECTION, SOLUTION INTRAMUSCULAR; INTRAVENOUS; SUBCUTANEOUS
Status: DISCONTINUED | OUTPATIENT
Start: 2021-01-25 | End: 2021-01-26 | Stop reason: HOSPADM

## 2021-01-25 RX ORDER — EPHEDRINE SULFATE 50 MG/ML
INJECTION, SOLUTION INTRAMUSCULAR; INTRAVENOUS; SUBCUTANEOUS
Status: DISPENSED
Start: 2021-01-25 | End: 2021-01-25

## 2021-01-25 RX ORDER — ACETAMINOPHEN 325 MG/1
650 TABLET ORAL EVERY 4 HOURS PRN
Status: DISCONTINUED | OUTPATIENT
Start: 2021-01-25 | End: 2021-01-26 | Stop reason: HOSPADM

## 2021-01-25 RX ORDER — OXYTOCIN/0.9 % SODIUM CHLORIDE 30/500 ML
PLASTIC BAG, INJECTION (ML) INTRAVENOUS
Status: DISCONTINUED
Start: 2021-01-25 | End: 2021-01-25 | Stop reason: HOSPADM

## 2021-01-25 RX ORDER — OXYTOCIN/0.9 % SODIUM CHLORIDE 30/500 ML
340 PLASTIC BAG, INJECTION (ML) INTRAVENOUS CONTINUOUS PRN
Status: DISCONTINUED | OUTPATIENT
Start: 2021-01-25 | End: 2021-01-26 | Stop reason: HOSPADM

## 2021-01-25 RX ORDER — PENICILLIN G POTASSIUM 5000000 [IU]/1
5 INJECTION, POWDER, FOR SOLUTION INTRAMUSCULAR; INTRAVENOUS ONCE
Status: COMPLETED | OUTPATIENT
Start: 2021-01-25 | End: 2021-01-25

## 2021-01-25 RX ORDER — CARBOPROST TROMETHAMINE 250 UG/ML
250 INJECTION, SOLUTION INTRAMUSCULAR
Status: DISCONTINUED | OUTPATIENT
Start: 2021-01-25 | End: 2021-01-25

## 2021-01-25 RX ORDER — ONDANSETRON 2 MG/ML
4 INJECTION INTRAMUSCULAR; INTRAVENOUS EVERY 6 HOURS PRN
Status: DISCONTINUED | OUTPATIENT
Start: 2021-01-25 | End: 2021-01-25

## 2021-01-25 RX ORDER — FENTANYL CITRATE 50 UG/ML
50-100 INJECTION, SOLUTION INTRAMUSCULAR; INTRAVENOUS
Status: DISCONTINUED | OUTPATIENT
Start: 2021-01-25 | End: 2021-01-25

## 2021-01-25 RX ORDER — HYDROCORTISONE 2.5 %
CREAM (GRAM) TOPICAL 3 TIMES DAILY PRN
Status: DISCONTINUED | OUTPATIENT
Start: 2021-01-25 | End: 2021-01-26 | Stop reason: HOSPADM

## 2021-01-25 RX ORDER — BISACODYL 10 MG
10 SUPPOSITORY, RECTAL RECTAL DAILY PRN
Status: DISCONTINUED | OUTPATIENT
Start: 2021-01-27 | End: 2021-01-26 | Stop reason: HOSPADM

## 2021-01-25 RX ORDER — AMOXICILLIN 250 MG
2 CAPSULE ORAL 2 TIMES DAILY
Status: DISCONTINUED | OUTPATIENT
Start: 2021-01-25 | End: 2021-01-26 | Stop reason: HOSPADM

## 2021-01-25 RX ORDER — BETAMETHASONE SODIUM PHOSPHATE AND BETAMETHASONE ACETATE 3; 3 MG/ML; MG/ML
12 INJECTION, SUSPENSION INTRA-ARTICULAR; INTRALESIONAL; INTRAMUSCULAR; SOFT TISSUE ONCE
Status: COMPLETED | OUTPATIENT
Start: 2021-01-25 | End: 2021-01-25

## 2021-01-25 RX ORDER — IBUPROFEN 800 MG/1
800 TABLET, FILM COATED ORAL
Status: DISCONTINUED | OUTPATIENT
Start: 2021-01-25 | End: 2021-01-25

## 2021-01-25 RX ORDER — EPHEDRINE SULFATE 50 MG/ML
5 INJECTION, SOLUTION INTRAMUSCULAR; INTRAVENOUS; SUBCUTANEOUS
Status: DISCONTINUED | OUTPATIENT
Start: 2021-01-25 | End: 2021-01-25

## 2021-01-25 RX ORDER — ACETAMINOPHEN 325 MG/1
650 TABLET ORAL EVERY 4 HOURS PRN
Status: DISCONTINUED | OUTPATIENT
Start: 2021-01-25 | End: 2021-01-25

## 2021-01-25 RX ORDER — OXYCODONE HYDROCHLORIDE 5 MG/1
5 TABLET ORAL EVERY 4 HOURS PRN
Status: DISCONTINUED | OUTPATIENT
Start: 2021-01-25 | End: 2021-01-26 | Stop reason: HOSPADM

## 2021-01-25 RX ORDER — MODIFIED LANOLIN
OINTMENT (GRAM) TOPICAL
Status: DISCONTINUED | OUTPATIENT
Start: 2021-01-25 | End: 2021-01-26 | Stop reason: HOSPADM

## 2021-01-25 RX ORDER — OXYTOCIN 10 [USP'U]/ML
10 INJECTION, SOLUTION INTRAMUSCULAR; INTRAVENOUS
Status: DISCONTINUED | OUTPATIENT
Start: 2021-01-25 | End: 2021-01-25

## 2021-01-25 RX ADMIN — SODIUM CHLORIDE, POTASSIUM CHLORIDE, SODIUM LACTATE AND CALCIUM CHLORIDE 1000 ML: 600; 310; 30; 20 INJECTION, SOLUTION INTRAVENOUS at 09:37

## 2021-01-25 RX ADMIN — DOCUSATE SODIUM AND SENNOSIDES 1 TABLET: 8.6; 5 TABLET ORAL at 20:37

## 2021-01-25 RX ADMIN — IBUPROFEN 800 MG: 800 TABLET, FILM COATED ORAL at 18:15

## 2021-01-25 RX ADMIN — Medication 2.5 MILLION UNITS: at 13:34

## 2021-01-25 RX ADMIN — LIDOCAINE HYDROCHLORIDE,EPINEPHRINE BITARTRATE 3 ML: 15; .005 INJECTION, SOLUTION EPIDURAL; INFILTRATION; INTRACAUDAL; PERINEURAL at 12:30

## 2021-01-25 RX ADMIN — PENICILLIN G POTASSIUM 5 MILLION UNITS: 5000000 POWDER, FOR SOLUTION INTRAMUSCULAR; INTRAPLEURAL; INTRATHECAL; INTRAVENOUS at 09:38

## 2021-01-25 RX ADMIN — BETAMETHASONE SODIUM PHOSPHATE AND BETAMETHASONE ACETATE 12 MG: 3; 3 INJECTION, SUSPENSION INTRA-ARTICULAR; INTRALESIONAL; INTRAMUSCULAR at 10:06

## 2021-01-25 RX ADMIN — Medication 10 ML/HR: at 12:32

## 2021-01-25 RX ADMIN — Medication 340 ML/HR: at 17:33

## 2021-01-25 RX ADMIN — Medication 8 ML: at 12:33

## 2021-01-25 RX ADMIN — SODIUM CHLORIDE, POTASSIUM CHLORIDE, SODIUM LACTATE AND CALCIUM CHLORIDE 1000 ML: 600; 310; 30; 20 INJECTION, SOLUTION INTRAVENOUS at 10:15

## 2021-01-25 NOTE — PROGRESS NOTES
Blood pressure 90/52, temperature 98.2  F (36.8  C), temperature source Oral, resp. rate 16, last menstrual period 05/16/2020, SpO2 100 %.    General appearance: comfortable with labor epidural.  CONTRACTIONS: every 4-6 minutes and moderate  Pitocin- none,  Antibiotics- PCN  FETAL HEART TONES: continuous EFM- baseline 150 with moderate variability and positive accelerations. No decelerations.  ROM: consented to AROM, clear fluid at 1605  PELVIC EXAM: 8/ 85%/ Anterior/ soft/ -1 to 0    ASSESSMENT:  ==============  IUP @ 36w2d in active labor   Fetal Heart Rate Tracing category one  GBS- pending. Adequate treatment.   COVID positive    PLAN:  ===========  Frequent position changes to facilitate labor with epidural anesthesia.  Prior to offering AROM to patient, discussed with Dr. Meade, agrees with plan. Labor augmentation with AROM reviewed with pt. Agreeable to plan.  Continue prophylactic IV antibiotic PCN for positive GBS status.   Reevaluate in 1-2 hours [id number] Karimi CNM

## 2021-01-25 NOTE — H&P
"ADMIT NOTE  =================  36w2d    Janey John is a 30 year old female with an Patient's last menstrual period was 2020. and Estimated Date of Delivery: 2021 [facility] at 8:47 AM in early active labor.     HPI  ================  Pt reports contractions that began at ~0300, have become increasingly more frequent and more uncomfortable. Reports some mucous-like discharge, denies bleeding. Denies leaking of fluid. Reports normal fetal movement.     Baby with known CHD (DORV, VSD) and FGR, last growth ultrasound showed 12% for growth and AC.     Contractions- moderate  Fetal movement- active  ROM- no   Vaginal bleeding- none  GBS- unknown  FOB- is involved  Other labor support- none    Weight gain- 175 - 141 lbs, Total weight gain- 35 lbs  First prenatal visit at 13? 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 Antegrade flow across the pulmonary  valve.The right and left branch pulmonary arteries are borderline hypoplastic.  The ductus arteriosus was not well seen on today's study. Normal right and  left ventricular size and systolic function.  [x] Rubella\" immune  [ ] Hep B immune/nonimmune  [X] Pap: NILM  [ ] Started ASA   [ ] NO plan utox in labor   _____________________________________  [ ] EOB folder  [x] PP Contraception plan: natural family " planning  [x] Labor plans: open to all options. Considering epidural with this delivery. Continuous EFM, NICU for delivery. Baby will need non-urgent  echo.   [ ] :  [x] Infant feeding plan: breast/pumping  [x] FLU shot  [x] TDAP given  [ ] Rhogam if needed, date:  [ ] TOLAC consent done  [ ] Waterbirth declines, consent done  [x] GCT, passed  ________________________________________  [ ] GBS pos; neg  [ ] OTC PP meds sent  [ ] Planning CS-ERAS pkt          (normal spontaneous vaginal delivery) 2017     Priority: Medium     HISTORIES  ============  No Known Allergies  No past medical history on file.  Past Surgical History:   Procedure Laterality Date     WRIST SURGERY      ganglion cyst     No family history on file.  Social History     Tobacco Use     Smoking status: Never Smoker     Smokeless tobacco: Never Used   Substance Use Topics     Alcohol use: Not Currently     OB History    Para Term  AB Living   3 2 2 0 0 2   SAB TAB Ectopic Multiple Live Births   0 0 0 0 2      # Outcome Date GA Lbr Sav/2nd Weight Sex Delivery Anes PTL Lv   3 Current            2 Term  40w1d       JAUN   1 Term  40w0d       JAUN      LABS:   ===========  Prenatal Labs:  Rhogam not indicated No results found for: ABO, RH, AS, HEPBANG, TREPAB, RUBELLAABIGG, HGB, HIV  Rubella immune  GCT, passed per MFM note  GBS unknown    Other labs:  No results found for this or any previous visit (from the past 24 hour(s)).    ROS  =========  Pt denies significant respiratory, cardiovacular, GI, or muscular/skeletalcomplaints.    See RN data base ROS.     PHYSICAL EXAM:  ===============  LMP 2020   General appearance: uncomfortable with contractions, coping well by breathing through  GENERAL APPEARANCE: healthy, alert and no distress  RESP: normal respiratory effort  CV: regular rates and rhythm  ABDOMEN:  soft, nontender, no epigastric pain  SKIN: no suspicious lesions or rashes  NEURO: Denies  headache, blurred vision, other vision changes  PSYCH: mentation appears normal. and affect normal/bright  Legs: Reflexes normal bilaterally     Abdomen: gravid, vertex fetus per Leopold's, non-tender between contractions.   EFW-  5.5 lbs.   CONTRACTIONS: irreg and moderate  FETAL HEART TONES: continuous EFM- baseline 135 with moderate variability and positive accelerations. No decelerations.  PELVIC EXAM: 4.5/ 75/ Mid/soft/-1, sutures palpated  BRINK SCORE: 9-10  BLOODY SHOW: no   ROM:no  FLUID: none  AMNISURE: not done    ASSESSMENT:  ==============  [facility]UP @ 36w2d admitted in early active labor   NST NOT DONE  Fetal Heart Rate - category one  GBS- not done    Patient Active Problem List   Diagnosis      (normal spontaneous vaginal delivery)     Pregnant     Labor and delivery, indication for care     PLAN:  ===========  Admit - see IP orders  NICU updated on patient's arrival and status.  Plan on NICU at delivery.  Discussed dose of betamethasone for fetal lung maturity and risks and benefits.  Pt opts for betamethasone.   Pt desires epidural at some point, anesthesia updated.  Prophylactic IV antibiotic for unknown GBS status and < 37 weeks reviewed with pt. Agreeable to PCN.  GBS collected.  Ambulation, hydration, position changes, birthing ball and tub options to facilitate labor reviewed with pt .  Reevaluate in 1-2 hours KHUSHBOO Cervantes CNM

## 2021-01-25 NOTE — PROGRESS NOTES
Blood pressure 116/62, temperature 98.4  F (36.9  C), temperature source Oral, resp. rate 18, last menstrual period 2020.  Patient Vitals for the past 24 hrs:   BP Temp Temp src Resp   21 0825 116/62 98.4  F (36.9  C) Oral 18     General appearance: uncomfortable with contractions, but coping well.  Feeling more pressure with contractions. Desires SVE. Prefers to stay in bed for labor.   CONTRACTIONS: every 6-7 minutes and moderate  Pitocin- none,  Antibiotics- PCN  FETAL HEART TONES: continuous EFM- baseline 125 with moderate variability and positive accelerations. No decelerations.  ROM: not ruptured  PELVIC EXAM: 6.5/ 80%/ Anterior/ soft/ -1     ASSESSMENT:  ==============  IUP @ 36w2d in active labor   Fetal Heart Rate Tracing category one  GBS- pending  COVID positive    PLAN:  ===========  Ambulation, hydration, position changes, birthing ball/sling and tub options to facilitate labor.  Pain management plan- pt is interested in epidural.  Anesthesia aware, pt will alert team when needed.  Reevaluate in 1-2 hours prn  Anticipate   Continue prophylactic IV antibiotic PCN for positive GBS status.     KHUSHBOO Hamilton CNM

## 2021-01-25 NOTE — PLAN OF CARE
[facility] with c/o contractions since 0300. Denies leaking or bleeding. Lab called to inform pt COVID+. GBS pending. NICU aware. Baby with DORV. Pt requesting epidural. Had to wait until anesthesia available for epidural. Epidural placed 1230. Pt now comfortable. Cervix 8 with BBOW. Continue close monitoring.

## 2021-01-25 NOTE — PROGRESS NOTES
Blood pressure 107/63, temperature 98.4  F (36.9  C), temperature source Oral, resp. rate 18, last menstrual period 05/16/2020, SpO2 100 %.    General appearance: comfortable with epidural   CONTRACTIONS: every 2-6 minutes and moderate  Pitocin- none,  Antibiotics- PCN  FETAL HEART TONES: continuous EFM- baseline 135 with moderate variability and positive accelerations. No decelerations.  ROM: not ruptured  PELVIC EXAM: 8/ 90%/ Anterior/ soft/ -1, BBOW    ASSESSMENT:  ==============  IUP @ 36w2d in active labor   Fetal Heart Rate Tracing category one  GBS- pending, 2nd dose of PCN running  COVID positive    PLAN:  ===========  Frequent position changes to facilitate labor with epidural anesthesia.  Continue prophylactic IV antibiotic PCN for positive GBS status.   Reevaluate in 2 hours KHUSHBOO CervantesM

## 2021-01-25 NOTE — L&D DELIVERY NOTE
DELIVERY NOTE:  Janey John is a 30 year old  at 36w2d [facility]. Pt progressed to 8 cm with BBOW. AROM performed with patient's consent. Felt strong urge to push at ~1710 and found to be C/C/+1.  Pushed effectively with CNM and RN coaching. FHR with terminal bradycardia despite repositioning, delivery imminent.  NICU called to delivery. Head delivered OP. Loose cord around body. Shoulders easily delivered under maternal effort and cord reduced.  Live male  delivered at 1731 under epidural anesthesia.  Spontaneous breath, vigorous cry, well flexed, HR>100. Infant directly to maternal abdomen, skin to skin. Delayed cord clamping until cord quit pulsing, then clamped x2 and cut by FOB. 20 units of pitocin infusing after baby with pt's consent. Cord blood obtained for typing. Cord segment for gases. Intact placenta spontaneously delivered via Nunez at 1737.  3 vessel cord. Fundus firm midline and firm with massage. Vagina, perineum, and rectum inspected, minor 1st degree laceration noted. Hemostatic and did not require repair.  Mother and infant stable.  Baby transferred to NICU team for planned admission. Good family bonding observed while family together.    Delivery Note:   IUP at 36 weeks gestation delivered on 2021.     delivery of a viable Male infant.  Weight : pending  Apgars of 8 at 1 minute and 9 at 5 minutes.  Labor was spontaneous.  Medications administered  in labor:  Pain Rx Epidural; Antibiotics Yes: PCN and IV antibiotics infused greater than 4 hours; Other n/a  Perineum: 1st degree, no repair  Placenta-mechanism: spontaneous, intact,  with a 3 vessel cord. Placenta was sent to Pathology. IV oxytocin was given After delivery of baby  Quantitative Blood Loss was 63cc.  Complications of labor and delivery: Prematurity (<37 wks)  Anticipated Discharge Date: 21  Birth attendants: CHHAYA Jones APRN  CNM    Delivery Summary    Janey John MRN# [id number]   Age: 30 year old YOB: 1990     ASSESSMENT & PLAN:   , COVID positive       Corrine John [[id number]]    Labor Event Times    Labor onset date: 21 Onset time:  8:40 AM      Labor Events     labor?: Yes   steroids: Partial Course  Labor Type: Spontaneous  Predominate monitoring during 1st stage: continuous electronic fetal monitoring     Rupture date/time: 21 1605   Rupture type: Artificial Rupture of Membranes  Fluid color: Clear  Fluid odor: Normal     Delivery/Placenta Date and Time    Delivery Date: 21 Delivery Time:  5:31 PM   Placenta Date/Time: 2021  5:37 PM  Oxytocin given at the time of delivery: after delivery of baby     Labor Events and Shoulder Dystocia    Fetal Tracing Prior to Delivery: Category 2  Fetal Tracing Comments: terminal bradycardia  Shoulder dystocia present?: Neg     Delivery (Maternal) (Provider to Complete) ([id number])    Episiotomy: None  Perineal lacerations: 1st Repaired?: No      Blood Loss  Mother: Janey John #[id number]   Start of Mother's Information    IO Blood Loss  21 0840 - 21 1756    None           End of Mother's Information  Mother: Janey John #[id number]          Delivery - Provider to Complete ([id number])    Delivering clinician: Urbano White APRN CNM  CNM Care: Exclusive CNM care in labor  Attempted Delivery Types (Choose all that apply): Spontaneous Vaginal Delivery  Delivery Type (Choose the 1 that will go to the Birth History): Vaginal, Spontaneous                   Other personnel:  Provider Role   Rahel Lemon, RN Delivery Nurse   Leela Moore RN Delivery Assist                Placenta    Delayed Cord Clamping: Done  Date/Time: 2021  5:37 PM  Removal: Spontaneous  Comments: brain OGDEN intact  Disposition: Pathology           Anesthesia    Method: Epidural  Cervical dilation at placement: 4-7                 Presentation and Position    Presentation: Vertex     Occiput Posterior                 Urbano hWite, KHUSHBOO CNM

## 2021-01-25 NOTE — ANESTHESIA PREPROCEDURE EVALUATION
Anesthesia Pre-Procedure Evaluation    Patient: Janey John   MRN: [id number] : 1990        Preoperative Diagnosis: * No surgery found *   Procedure :      No past medical history on file.   Past Surgical History:   Procedure Laterality Date     WRIST SURGERY      ganglion cyst      No Known Allergies   Social History     Tobacco Use     Smoking status: Never Smoker     Smokeless tobacco: Never Used   Substance Use Topics     Alcohol use: Not Currently      Wt Readings from Last 1 Encounters:   21 79.7 kg (175 lb 11.2 oz)        Anesthesia Evaluation            ROS/MED HX  ENT/Pulmonary:  - neg pulmonary ROS     Neurologic:  - neg neurologic ROS     Cardiovascular:  - neg cardiovascular ROS     METS/Exercise Tolerance:     Hematologic:  - neg hematologic  ROS     Musculoskeletal:  - neg musculoskeletal ROS     GI/Hepatic:  - neg GI/hepatic ROS     Renal/Genitourinary:  - neg Renal ROS     Endo:  - neg endo ROS     Psychiatric/Substance Use:  - neg psychiatric ROS     Infectious Disease:  - neg infectious disease ROS     Malignancy:  - neg malignancy ROS     Other:            Physical Exam    Airway  airway exam normal      Mallampati: II   TM distance: > 3 FB   Neck ROM: full   Mouth opening: > 3 cm    Respiratory Devices and Support         Dental  no notable dental history         Cardiovascular   cardiovascular exam normal          Pulmonary   pulmonary exam normal                OUTSIDE LABS:  CBC:   Lab Results   Component Value Date    WBC 15.9 (H) 2021    HGB 13.0 2021    HCT 37.7 2021     2021     BMP: No results found for: NA, POTASSIUM, CHLORIDE, CO2, BUN, CR, GLC  COAGS: No results found for: PTT, INR, FIBR  POC: No results found for: BGM, HCG, HCGS  HEPATIC: No results found for: ALBUMIN, PROTTOTAL, ALT, AST, GGT, ALKPHOS, BILITOTAL, BILIDIRECT, RYNE  OTHER: No results found for: PH, LACT, A1C, YOAV, PHOS, MAG, LIPASE, AMYLASE, TSH, T4, T3, CRP,  SED    Anesthesia Plan     History & Physical Review      ASA Status:  2.   Plan for Epidural            Consents  Anesthesia Plan(s) and associated risks, benefits, and realistic alternatives discussed.    Questions answered and patient/representative(s) expressed understanding.    Discussed with:  Patient.             Postoperative Care            Sedrick Karimi MD

## 2021-01-25 NOTE — ANESTHESIA PROCEDURE NOTES
Epidural Procedure Note    Staff -   Anesthesiologist:  Terell Caraballo MD  Resident/Fellow: Sedrick Karimi MD  Performed By: resident  Procedure performed by resident/CRNA in presence of a teaching physician.    Location: OB     Procedure start time:  1/25/2021 12:20 PM     Procedure end time:  1/25/2021 12:32 PM   Pre-procedure checklist:   patient identified, IV checked, risks and benefits discussed, informed consent, monitors and equipment checked and pre-op evaluation      Correct Patient: Yes      Correct Position: Yes      Correct Site: Yes      Correct Procedure: Yes      Correct Laterality:  N/A    Site Marked:  N/A  Procedure:     Procedure:  Epidural catheter    ASA:  2    Diagnosis:  Labor    Position:  Sitting    Sterile Prep: chloraprep      Insertion site:  L3-4    Local skin infiltration:  2% lidocaine    amount (mL):  3    Approach:  Midline    Needle gauge (G):  17    Needle Length (in):  3.5    Block Needle Type:  Touhy    Injection Technique:  LORT saline    GLORIA at (cm):  5.5    Attempts:  1    Redirects:  2    Catheter gauge (G):  19    Threaded to cm at skin:  11    Threaded in epidural space (cm):  5.5    Paresthesias:  No    Aspiration negative for Heme or CSF: Yes      Test dose (mL):  3     Local anesthetic:  Lidocaine 1.5% w/ 1:200,000 epinephrine    Test dose time:  12:30    Test dose negative for signs of intravascular, subdural or intrathecal injection: Yes    Assessment/Narrative:     Sensory Level Left:  T8    Sensory Level Right:  T9

## 2021-01-26 VITALS
OXYGEN SATURATION: 97 % | DIASTOLIC BLOOD PRESSURE: 60 MMHG | HEART RATE: 79 BPM | TEMPERATURE: 98.4 F | RESPIRATION RATE: 16 BRPM | SYSTOLIC BLOOD PRESSURE: 108 MMHG

## 2021-01-26 LAB
GP B STREP DNA SPEC QL NAA+PROBE: NEGATIVE
HGB BLD-MCNC: 12.1 G/DL (ref 11.7–15.7)
SPECIMEN SOURCE: NORMAL

## 2021-01-26 PROCEDURE — 250N000013 HC RX MED GY IP 250 OP 250 PS 637: Performed by: ADVANCED PRACTICE MIDWIFE

## 2021-01-26 PROCEDURE — 250N000011 HC RX IP 250 OP 636: Performed by: ADVANCED PRACTICE MIDWIFE

## 2021-01-26 PROCEDURE — 85018 HEMOGLOBIN: CPT | Performed by: ADVANCED PRACTICE MIDWIFE

## 2021-01-26 PROCEDURE — 36415 COLL VENOUS BLD VENIPUNCTURE: CPT | Performed by: ADVANCED PRACTICE MIDWIFE

## 2021-01-26 RX ORDER — IBUPROFEN 600 MG/1
600 TABLET, FILM COATED ORAL EVERY 6 HOURS PRN
Qty: 60 TABLET | Refills: 0 | Status: SHIPPED | OUTPATIENT
Start: 2021-01-26

## 2021-01-26 RX ORDER — AMOXICILLIN 250 MG
1 CAPSULE ORAL DAILY
Qty: 100 TABLET | Refills: 0 | Status: SHIPPED | OUTPATIENT
Start: 2021-01-26

## 2021-01-26 RX ADMIN — ENOXAPARIN SODIUM 40 MG: 40 INJECTION SUBCUTANEOUS at 13:25

## 2021-01-26 RX ADMIN — DOCUSATE SODIUM AND SENNOSIDES 1 TABLET: 8.6; 5 TABLET ORAL at 08:17

## 2021-01-26 SDOH — ECONOMIC STABILITY: TRANSPORTATION INSECURITY
IN THE PAST 12 MONTHS, HAS LACK OF TRANSPORTATION KEPT YOU FROM MEETINGS, WORK, OR FROM GETTING THINGS NEEDED FOR DAILY LIVING?: NOT ASKED

## 2021-01-26 SDOH — ECONOMIC STABILITY: INCOME INSECURITY: HOW HARD IS IT FOR YOU TO PAY FOR THE VERY BASICS LIKE FOOD, HOUSING, MEDICAL CARE, AND HEATING?: NOT ASKED

## 2021-01-26 SDOH — ECONOMIC STABILITY: FOOD INSECURITY: WITHIN THE PAST 12 MONTHS, THE FOOD YOU BOUGHT JUST DIDN'T LAST AND YOU DIDN'T HAVE MONEY TO GET MORE.: NOT ASKED

## 2021-01-26 SDOH — ECONOMIC STABILITY: FOOD INSECURITY: WITHIN THE PAST 12 MONTHS, YOU WORRIED THAT YOUR FOOD WOULD RUN OUT BEFORE YOU GOT MONEY TO BUY MORE.: NOT ASKED

## 2021-01-26 SDOH — ECONOMIC STABILITY: TRANSPORTATION INSECURITY
IN THE PAST 12 MONTHS, HAS THE LACK OF TRANSPORTATION KEPT YOU FROM MEDICAL APPOINTMENTS OR FROM GETTING MEDICATIONS?: NOT ASKED

## 2021-01-26 NOTE — PLAN OF CARE
Data: Vital signs within normal limits. Postpartum checks within normal limits - see flow record. Patient eating and drinking normally. Patient able to empty bladder independently, but has not been out of bed without assistance. No apparent signs of infection. Patient performing self cares.  Action: Patient medicated during the shift for pain. See MAR. Patient reassessed within 1 hour after each medication and pain was improved - patient stated she was comfortable. Patient education done about expectation for bleeding and pumping every 2-3 hours to establish milk supply. See flow record.  Response: Positive attachment behaviors observed with infant in that interested in watching Ipad with baby on screen and updates from NICU. Support person went home for evening to be with older children.   Plan: Anticipate discharge uncertain at this time.

## 2021-01-26 NOTE — PLAN OF CARE
Data: Vital signs within normal limits. Postpartum checks within normal limits - see flow record. Patient eating and drinking normally. Patient able to empty bladder independently and is up ambulating. No apparent signs of infection. Pt declined laceration check. Patient performing self cares and pumping independently.  Action: Denies need for pain medication. Patient education done, see flow record.  Response: Positive attachment behaviors observed with infant, patient able to view  on the Ipad while in the NICU.   Plan: Routine PP cares. Will discuss discharge plans with CNM on rounds.

## 2021-01-26 NOTE — LACTATION NOTE
"This note was copied from a baby's chart.  D:  I called Janey; this is her 3rd baby; her other children nursed \"well\".  She is normally in good health, takes no medications, and has no history of breast/chest surgery or trauma.  She has already started to pump and has a new insurance pump (not sure on brand).   I:  I left her a folder of introductory materials and went over pumping guidelines, as well as emailing the information and links to recommended videos, info on how to deliver milk here, and supplies needed for pumping at home.  I reviewed physiology of colostrum and milk production, pumping guidelines, and I gave her a log and encouraged her to use it.   I explained how to access the videos \"Hand Expression\" and \"Maximizing Milk Production\"; as well as other helpful books and websites.   We discussed hands-on pumping techniques and usefulness of a hands-free pumping bra.  We talked about using paced bottle feeding technique to feed him until they are reunited.   A:  Mom has information she needs to initiate her supply while .   P:  Will continue to provide lactation support.    Lorrie Ring, RNC, IBCLC          "
Patient Representative/Patient

## 2021-01-26 NOTE — PLAN OF CARE
Pt resting semi fowlers double electric pumping for 15 min now. Fair amount of colostrum collected .

## 2021-01-26 NOTE — PLAN OF CARE
Vaginal Delivery Note   of viable Male with FRANKI White CNM in attendance. Nursery RN Leela present.  Infant with spontaneous cry, to mother's abdomen, dried and stimulated.  pending.  Placenta delivered with out complication. 1st degree laceration without repair. Adelia cares provided and ice appliedc to perineum.  Mother in stable condition. Baby to NICU for monitoring due to known CHD.

## 2021-01-26 NOTE — PROGRESS NOTES
Patient awake, ambulated to restroom. Voiding well. Performed pericare and oral care independently. Provided with clean mesh panties. Ambulated around room. Reviewed new mom handbook, birth certificate worksheets,  screen handouts, CDC VIS for hep B injection and depression scale.  Pt states she was happy to have gotten some rest and RN inquired if pt was okay with pumping. Pt agreed, sitting up in bed. RN assisted pt with setting up double breast pump. Pt self massaged both breasts while equipment being set up. Pt now double pumping. RN brought IPAD of  closer for mother to look at while pumping. Pt denies any pain, headache, blurry vision, N/V.

## 2021-01-26 NOTE — PROGRESS NOTES
Janey has been resting with no complaint of pain. Up to void at 2320, clear yellow urine, scant bleeding noted, no clots. Fundus assessed, see Flowsheet. Pericare done on own. Complete linen change by RN for the night and breastpump cleaned and set to dry. Pt was educated on cleaning her breastpump and frequency/duration of pumping sessions. Pt had concerns about not producing enough milk. Sat and discussed with patient the importance of her colostrum at this time and any amount she obtains is beneficial to her baby. Reviewed massaging of the breasts and manual expression along with use of double electric pump. All questions were answered at the bedside. VSS and fresh water provided. Pt denies any needs for food or pain control. Positioned to right lateral and resting with eyes closed. IPAD is bedside charging, her infant is on IPAD via NICU.

## 2021-01-26 NOTE — PROGRESS NOTES
01/26/21 0909   Spiritual Beliefs   In support of your spiritual health, is there someone we may contact for you? (identify all that apply)   (shs/1.26)   Visit Information   Visit Made By Staff    Type of Visit Initial   Visited Patient   Interventions   Basic Spiritual Interventions    introduction/orientation to Spiritual Health Services;Assessment of spiritual needs/resources     SPIRITUAL HEALTH SERVICES: Tele-Encounter  Patient Location (Hospital, [facility], Unit): [facility], , [facility]  Spoke with (patient, family relationship): Patient      Referral Source: Following up on voicemail left by patient's family member requesting  support for patient and baby.    If applicable: patient was appropriately screened for telechaplaincy support with bedside nurse prior to visit (e.g. Mental Health and Addiction contexts). See call details below.    DATA:  I introduced SHS to patient and inquired if she had any needs at this time due to baby's admission to the NICU.  She declined but is aware that she can always request  support through her nurse or baby's nurse in NICU.       PLAN:  No follow-up planned but [facility] remains available for the duration of patient's hospitalization.      Hilary Polo    ______________________________    Type of service:  Telephone Visit     has received verbal consent for a TelephoneVisit from the patient? Yes    Distance Provider Location: designated [place] office or home office (secure setting)    Mode of Communication: telephone (via [employer] phone or [employer] tele-call-number ([phone]))

## 2021-01-26 NOTE — PLAN OF CARE
Patient prepared for discharge. Declines the Postpartum Depression video as she has watched with previous two children. Patient plans to take her Lovenox dose here, but does not wish to continue at home. Discharged with instructions, verbalized understanding, at 1535.

## 2021-01-26 NOTE — PROGRESS NOTES
Pt resting right lateral with eyes closed. arousal to name, denies any pain, provided with toiletries to shower this morning. Pt wishes to pump. Pump supplies set up and mother initiated double electric pumping. She has completed her EPDS. Reviewed with patient and she has no other questions or concerns. Placed in chart. Currently pumping. Informed will return in 30 min to assess, reoriented to call light, will call if need arises.

## 2021-01-26 NOTE — DISCHARGE INSTRUCTIONS
Postpartum Vaginal Delivery Instructions    Activity       Ask family and friends for help when you need it.    Do not place anything in your vagina for 6 weeks.    You are not restricted on other activities, but take it easy for a few weeks to allow your body to recover from delivery.  You are able to do any activities you feel up to that point.    No driving until you have stopped taking your pain medications (usually two weeks after delivery).     Call your health care provider if you have any of these symptoms:       Increased pain, swelling, redness, or fluid around your stiches from an episiotomy or perineal tear.    A fever above 100.4 F (38 C) with or without chills when placing a thermometer under your tongue.    You soak a sanitary pad with blood within 1 hour, or you see blood clots larger than a golf ball.    Bleeding that lasts more than 6 weeks.    Vaginal discharge that smells bad.    Severe pain, cramping or tenderness in your lower belly area.    A need to urinate more frequently (use the toilet more often), more urgently (use the toilet very quickly), or it burns when you urinate.    Nausea and vomiting.    Redness, swelling or pain around a vein in your leg.    Problems breastfeeding or a red or painful area on your breast.    Chest pain and cough or are gasping for air.    Problems coping with sadness, anxiety, or depression.  If you have any concerns about hurting yourself or the baby, call your provider immediately.     You have questions or concerns after you return home.     Keep your hands clean:  Always wash your hands before touching your perineal area and stitches.  This helps reduce your risk of infection.  If your hands aren't dirty, you may use an alcohol hand-rub to clean your hands. Keep your nails clean and short.          Coronavirus Disease 2019 (COVID-19): Pregnancy and Childbirth    If you re pregnant or just had a baby, you likely have many questions about how COVID-19 could  affect you and your child. Researchers are still learning more about how the virus affects pregnant women and their babies. Below is information to help you work with your healthcare team.  What are my risks of COVID-19 while pregnant?  Researchers don t know if pregnant women are more likely to get COVID-19. But pregnancy can cause changes to your immune system that can cause any viral illness to be more severe. You should take extra care not to get sick during this time. This includes:    Wearing a face mask in public. Wear the mask so that it covers both your nose and mouth    Washing your hands often    Using hand  when soap and water aren t available    Staying at least 6 feet away from anyone not part of your household    Staying away from anyone who is sick    Cleaning and disinfecting frequently touched surfaces every day    Not traveling if it s not urgent  What are the risks to my baby?  Researchers don t exactly know yet what the risks are with COVID-19 for babies. These are some things they do know:    High fever from any cause in the first trimester of pregnancy can raise the risk for some kinds of birth defects. Tell your healthcare provider if you have a fever. He or she will help you work to keep your fever down.    There are only a few cases of babies found infected with COVID-19 within a few days of birth. But experts don t know if the babies picked up the virus while in the mother s womb, during childbirth, or just after.     birth and low birth weight has happened in cases of other types of coronavirus, such as MERS, and SARS from . But experts don t yet know if these are a risk with COVID-19.    Miscarriage and stillbirth have also happened with MERS and earlier SARS. But experts don t yet know if these are a risk with COVID-19.  Is it safe to keep my healthcare appointments?  Your healthcare team may change some of your appointments to a phone call or video chat. If you need  a blood test, ultrasound, or other test in person, you may need to come without your partner. Wear a mask covering both your nose and mouth, use hand , and follow all instructions from the healthcare staff at the visit to protect yourself from the virus. If you have any symptoms of COVID-19, call your healthcare office before you go to your appointment. They will give you instructions to follow.  What if someone in my home is sick with COVID-19 symptoms?  If your partner or another household member has COVID-19 symptoms, he or she should self-isolate. This means staying in one part of the household away from others. He or she should not share food, towels, sheets, or other personal items. Clean common-use surfaces often, such as doorknobs and counter tops. If your partner is sick and it s near your due date, ask your healthcare provider how best to manage when you go into labor. You may be given specific instructions.  Is it safe to give birth at a hospital or birth center?  Medical facilities are taking a lot of safety steps to protect people from COVID-19. Talk with your healthcare provider about the hospital or birth center you are planning to use. Ask where and how pregnant women and their partners and babies are protected. Keep in mind that your birth plan may need to change.  If you have COVID-19 and are in labor, call your healthcare provider and delivery unit before you arrive. Your hospital or birthing center will take steps to protect people around you from infection. You will need to wear a medical mask covering your nose and mouth. You may be in a special room that helps prevent infections from spreading. Your baby may need to be in a separate room after birth. Ask the hospital what to expect if you are pregnant and have COVID-19.  Before and after birth, you will likely be asked to limit the number of visitors at the hospital. This is important to reduce risk of infection to everyone in the  hospital. Follow all healthcare staff instructions, including their instructions on how to prepare your home for when you and baby go home.  Is it safe to give birth at home?  The risks of home birth vary with each woman and each pregnancy. Talk with your healthcare team about the benefits and risks for your pregnancy. Home birth at this time may mean that emergency care could be delayed. If you were planning birth in a hospital or birth center, your healthcare provider may advise that this is still the safest plan.  Is it safe to hold my baby or breastfeed?  The virus hasn t been found in the breastmilk of women with COVID-19. But the virus can spread through coughing, sneezing, and talking. If you have or may have COVID-19, wear a mask while holding your baby or breastfeeding. Wear the mask so that it covers both your nose and mouth. Wash your hands often when caring for your baby. Your provider may advise you to pump breastmilk to be given to your child by your partner. Wash your hands before and after using the breast pump supplies. If you have COVID and want to breastfeed your baby, talk with your healthcare provider about the best ways to protect your baby.  How can I care for my baby (after discharge) if I am positive for COVID-19?  You will need to self-isolate to limit contact with your baby. You will need to wear a mask and wear clean clothes when holding or feeding your baby. Wear the mask so that it covers both your nose and mouth. You can pump breastmilk and store it to maintain your milk supply until you are no longer infectious, in 7 to 10 days. Or you can pump and have your partner use this milk to feed the baby.  Is it safe to have visitors see the baby, or help with baby care?  To be safe, it s best to limit visitors, especially people who have recently traveled. People who travel are at higher risk of carrying the virus. Only the closest, well family members should be in direct contact with the  baby. Ask anyone who is sick to not visit. Healthy visitors should wear face masks covering both their nose and mouth, and keep at least 6 feet from you and the baby. It s also best to limit contact with people who are at higher risk for problems from COVID-19. This includes older adults and people with certain health conditions.  If a visitor is to hold the baby, they should wash their hands first. Wrap the baby in a blanket and then remove the blanket afterward. The visitor should then wash their hands. Visitors should not kiss or touch the baby s face. This does not apply to the closest family members unless they are sick.  Close family members should limit their contact with others and wash their hands before touching the baby.  When to call your healthcare provider  If you re pregnant and have COVID-19 symptoms, call your healthcare provider right away. He or she will ask you questions about your health. You may be advised to stay home and treat your symptoms. Or you may be advised to get medical care.  Last modified date: 5/8/2020   James last reviewed this educational content on 4/1/2020 2000-2020 [employer]. [address] [place] [address]. All rights reserved. This information is not intended as a substitute for professional medical care. Always follow your healthcare professional's instructions.

## 2021-01-26 NOTE — PLAN OF CARE
Patient crying in room, feels frustrated being here and unable to see her baby. Margaret Hanna notified, she will call the patient and plan a discharge home.

## 2021-01-26 NOTE — DISCHARGE SUMMARY
[facility] Discharge Summary    Janey John MRN# [id number]   Age: 30 year old YOB: 1990     Date of Admission:  2021  Date of Discharge::  2021  Admitting Physician:  KHUSHBOO Estevez CNM  Discharge Physician:  KHUSHBOO Prakash CNM, [place], MS      Home clinic: [facility]          Admission Diagnoses:   Labor and delivery, indication for care [O75.9]   (normal spontaneous vaginal delivery) [O80]          Discharge Diagnosis:   Normal spontaneous vaginal delivery  Intrauterine pregnancy at 36 weeks gestation          Procedures:   Procedure(s): No additional procedures performed       No other significant procedures performed during this admission           Medications Prior to Admission:     Medications Prior to Admission   Medication Sig Dispense Refill Last Dose     Prenatal Vit-Fe Fumarate-FA (PNV PRENATAL PLUS MULTIVITAMIN) 27-1 MG TABS per tablet Take 1 tablet by mouth   2021 at Unknown time             Discharge Medications:     Current Discharge Medication List      START taking these medications    Details   ibuprofen (ADVIL/MOTRIN) 600 MG tablet Take 1 tablet (600 mg) by mouth every 6 hours as needed for moderate pain Start after delivery  Qty: 60 tablet, Refills: 0    Associated Diagnoses:  (normal spontaneous vaginal delivery)      senna-docusate (SENOKOT-S/PERICOLACE) 8.6-50 MG tablet Take 1 tablet by mouth daily Start after delivery.  Qty: 100 tablet, Refills: 0    Associated Diagnoses:  (normal spontaneous vaginal delivery)         CONTINUE these medications which have NOT CHANGED    Details   Prenatal Vit-Fe Fumarate-FA (PNV PRENATAL PLUS MULTIVITAMIN) 27-1 MG TABS per tablet Take 1 tablet by mouth                   Consultations:   Consultation during this admission received from NICU and meri          Brief History of Labor:   DELIVERY NOTE:  Janey John is a 30 year old  at 36w2d who [facility]  [facility] in  labor. Pt progressed to 8 cm with BBOW. AROM performed with patient's consent. Felt strong urge to push at ~1710 and found to be C/C/+1.  Pushed effectively with CNM and RN coaching. FHR with terminal bradycardia despite repositioning, delivery imminent.  NICU called to delivery. Head delivered OP. Loose cord around body. Shoulders easily delivered under maternal effort and cord reduced.  Live male  delivered at 1731 under epidural anesthesia.  Spontaneous breath, vigorous cry, well flexed, HR>100. Infant directly to maternal abdomen, skin to skin. Delayed cord clamping until cord quit pulsing, then clamped x2 and cut by FOB. 20 units of pitocin infusing after baby with pt's consent. Cord blood obtained for typing. Cord segment for gases. Intact placenta spontaneously delivered via Nunez at 1737.  3 vessel cord. Fundus firm midline and firm with massage. Vagina, perineum, and rectum inspected, minor 1st degree laceration noted. Hemostatic and did not require repair.  Mother and infant stable.  Baby transferred to NICU team for planned admission. Good family bonding observed while family together.     Delivery Note:   IUP at 36 weeks gestation delivered on 2021.     delivery of a viable Male infant.  Weight : pending  Apgars of 8 at 1 minute and 9 at 5 minutes.  Labor was spontaneous.  Medications administered  in labor:  Pain Rx Epidural; Antibiotics Yes: PCN and IV antibiotics infused greater than 4 hours; Other n/a  Perineum: 1st degree, no repair  Placenta-mechanism: spontaneous, intact,  with a 3 vessel cord. Placenta was sent to Pathology. IV oxytocin was given After delivery of baby  Quantitative Blood Loss was 63cc.  Complications of labor and delivery: Prematurity (<37 wks)  Anticipated Discharge Date: 21  Birth attendants: CHHAYA Jones APRN CNM        Assessment Day of Discharge    Per RN  Breasts:soft, nontender  Nipples:erect no  lesions  Fundus:umb   Abdomen:soft nontender  Lochia:min rubra no clots  Perineum:well approx, no swelling, no hematoma  Legs:nontender           Hospital Course:   The patient's hospital course was unremarkable.  On discharge, her pain was well controlled. Vaginal bleeding is similar to peak menstrual flow.  Voiding without difficulty.  Ambulating well and tolerating a normal diet.  No fever.  pumping well.  Infant is in NICU stable, doing better than expected  No bowel movement yet.*  She was discharged on post-partum day #1.    Post-partum hemoglobin:   Hemoglobin   Date Value Ref Range Status   01/26/2021 12.1 11.7 - 15.7 g/dL Final      ASSESSMENT/PLAN:  Stable Post-partum day #1  Complications:none  Plan d/c home today  RTC 2 weeks  Teaching done: D/C Instructions: Nutrition/Activity, Engorgement Management, Birth Control Options, Warning Signs/When to Call: Excessive Bleeding, Infection, PP Depression, Kegals and Crunches, RTC Clinic for PP Appointment and PNV    Postpartum warning s/s reviewed, including bleeding/clots, fever, mastitis, or depression    Birthcontrol planned:NFP  Current Discharge Medication List      CONTINUE these medications which have NOT CHANGED    Details   Prenatal Vit-Fe Fumarate-FA (PNV PRENATAL PLUS MULTIVITAMIN) 27-1 MG TABS per tablet Take 1 tablet by mouth                  Discharge Instructions and Follow-Up:   Discharge diet: Regular   Discharge activity: Activity as tolerated   Discharge follow-up: Follow up with cnm  in 2 and 6 weeks   Wound care: Drink plenty of fluids  Tub soaks           Discharge Disposition:   Discharged to home        KHUSHBOO Prakash CNM

## 2021-02-01 LAB — COPATH REPORT: NORMAL

## 2021-10-11 ENCOUNTER — HEALTH MAINTENANCE LETTER (OUTPATIENT)
Age: 31
End: 2021-10-11

## 2022-01-30 ENCOUNTER — HEALTH MAINTENANCE LETTER (OUTPATIENT)
Age: 32
End: 2022-01-30

## 2022-05-26 ENCOUNTER — DOCUMENTATION ONLY (OUTPATIENT)
Dept: CONSULT | Facility: CLINIC | Age: 32
End: 2022-05-26

## 2022-05-26 NOTE — PROGRESS NOTES
Date: 2022    Re:  Janey John   : 1990   [facility] member ID: [id number]    This letter of medical necessity is being submitted for the purpose of obtaining approval of coverage for genetic testing for Janey John.  Janey has a [id number]-vlv son who has 22q11.2 deletion syndrome.  He is followed in our Genetics Clinic at [facility].    22q11.2 deletion syndrome is a chromosomal syndrome caused by the deletion of a small piece of chromosome 22.   Common features of 22q11.2 deletion syndrome include congenital heart defects (most commonly conotruncal defects such as Tetralogy of Fallot), palatal abnormalities (cleft palate, velopharyngeal insufficiency, bifid uvula), and distinctive facial features.  Other features include feeding difficulty, immunodeficiency, hypocalcemia, delays in meeting developmental milestones, varying cognitive ability, autism spectrum disorder, psychiatric illness, skeletal abnormalities, kidney abnormalities, thrombocytopenia, and hearing loss.  The clinical features of 22q11.2 deletion syndrome vary greatly from one individual to another, even among affected members of the same family.    Some cases of 22q11.2 deletion are inherited from a parent.  Given that disease features can be quite variable, some parents may have subtle features of the condition and may be unaware of their diagnosis.  Therefore, parental testing for the 22q11.2 deletion is recommended in order to clarify inheritance as well as medical risks to the parents.  This will involve fluorescent in situ hybridization (FISH) analysis of the 22q11.2 region.    FISH (Fluorescent in Situ Hybridization) CPT codes: 88273 x 1, 88271 x 1, 88291 x 1, 88230 x 1.    If Janey or her  is found to have the 22q11.2 deletion, this would impact their medical management as they would need regular screening for associated complications of the disorder.  If the 22q11.2 deletion is found to be  inherited, then there would be a 50% risk to the couple's other two children as well as future children.  This information would significantly impact future reproductive risks for Janey, and would allow her to have the fullest range of reproductive options during a future pregnancy.     For the above reasons, this genetic testing for Janey is medically necessary.  Thank you for your prompt consideration for approval of coverage of this laboratory testing for Janey John.      Lydia Wakefield MS, [facility]  Licensed Genetic Counselor  [facility]  [phone]

## 2022-05-31 ENCOUNTER — TELEPHONE (OUTPATIENT)
Dept: CONSULT | Facility: CLINIC | Age: 32
End: 2022-05-31

## 2022-05-31 NOTE — TELEPHONE ENCOUNTER
I called Janey to ask more about insurance coverage.  Janey said she has medicaid but her  has [employer] coverage.  Janey agreed to email me copies of their insurance card.  I provided her my email address.    Joanna Aldridge MA  Department   Genetic Counseling Department  Phone: [phone]  Fax: [phone]

## 2022-06-07 ENCOUNTER — TELEPHONE (OUTPATIENT)
Dept: CONSULT | Facility: CLINIC | Age: 32
End: 2022-06-07

## 2022-06-07 DIAGNOSIS — Z82.79 FAMILY HISTORY OF CONGENITAL OR GENETIC CONDITION: Primary | ICD-10-CM

## 2022-06-07 NOTE — TELEPHONE ENCOUNTER
No prior authorization is required for Janey's medicaid plan.  I discussed with her the estimated out of pocket cost for the test.  Janey wants to move forward with the test, and will schedule a lab only appointment.    Joanna Aldridge MA  Department   Genetic Counseling Department  Phone: [phone]  Fax: [phone]

## 2022-06-08 ENCOUNTER — LAB (OUTPATIENT)
Dept: LAB | Facility: CLINIC | Age: 32
End: 2022-06-08
Attending: STUDENT IN AN ORGANIZED HEALTH CARE EDUCATION/TRAINING PROGRAM

## 2022-06-08 DIAGNOSIS — Z82.79 FAMILY HISTORY OF CONGENITAL OR GENETIC CONDITION: ICD-10-CM

## 2022-06-08 PROCEDURE — 88275 CYTOGENETICS 100-300: CPT

## 2022-06-08 PROCEDURE — 36415 COLL VENOUS BLD VENIPUNCTURE: CPT

## 2022-06-08 PROCEDURE — 88291 CYTO/MOLECULAR REPORT: CPT | Performed by: MEDICAL GENETICS

## 2022-06-14 LAB
CULTURE HARVEST COMPLETE DATE: NORMAL
CULTURE HARVEST COMPLETE DATE: NORMAL
INTERPRETATION: NORMAL

## 2022-06-15 ENCOUNTER — TELEPHONE (OUTPATIENT)
Dept: CONSULT | Facility: CLINIC | Age: 32
End: 2022-06-15

## 2022-06-15 NOTE — TELEPHONE ENCOUNTER
I left a message for Janey asking for a call back to discuss her genetic test results.  When she calls back, I will let her know that the FISH results for her and her  (to check for the 22q11.2 deletion) are back and are normal.  There is no evidence of a deletion or an interchromosomal rearrangement in either the maternal or paternal samples that could have given rise to the loss of 22q11.21 in their son.  This suggests that the 22q11.2 deletion in their son occurred as a de myles (brand new) event in him.  This would also suggest a low chance for Janey and her  to have another child with the 22q11.2 deletion.      Lydia Wakefield MS, [facility]  Licensed Genetic Counselor  [facility]

## 2022-09-24 ENCOUNTER — HEALTH MAINTENANCE LETTER (OUTPATIENT)
Age: 32
End: 2022-09-24

## 2023-05-08 ENCOUNTER — HEALTH MAINTENANCE LETTER (OUTPATIENT)
Age: 33
End: 2023-05-08

## 2024-07-14 ENCOUNTER — HEALTH MAINTENANCE LETTER (OUTPATIENT)
Age: 34
End: 2024-07-14